# Patient Record
Sex: FEMALE | Race: WHITE
[De-identification: names, ages, dates, MRNs, and addresses within clinical notes are randomized per-mention and may not be internally consistent; named-entity substitution may affect disease eponyms.]

---

## 2017-04-19 NOTE — PDOC
History of Present Illness





- General


History Source: Patient


Exam Limitations: No Limitations





- History of Present Illness


Initial Comments: 





04/19/17 15:00


The patient is a 27 year old female, with no significant past medical history 

who presents to the emergency department with left arm pain. The patient 

reports doing a handstand just prior to arrival when she fell and landed on her 

left arm, hearing a crack. She notes having numbness and tingling in her UEs. 

She notes having decreased ROM and strength in her arm secondary to pain. She 

denies recent fevers, chills, headache or dizziness. She denies recent nausea, 

vomit, diarrhea or constipation. 





Allergies: NKA 


Past surgical history: None reported.


Social history: Nonsmoker. Denies EtOH use and recreational drug use. 


Primary Care Physician: N/A











<Kilo Zapien - Last Filed: 04/19/17 15:00>





- History of Present Illness


Initial Comments: 





04/19/17 17:18


Physical exam: Well-developed well-nourished complaining of pain in the elbow 

cooperative


Afebrile, vital signs normal


The patient refuses to move the left elbow due to pain. However, there appears 

to be no appreciable swelling or deformity. There is point tenderness over the 

radial head. Pulses are full. No distal sensory or motor deficits.





X-ray: Negative





Impression: No significant fracture. There may be an occult fracture of the 

radial head. The patient was informed of this and orthopedic follow-up was 

recommended





Plan: Rest sling and early mobilization. Orthopedic follow-up as directed. 

Patient fully ambulatory, pain adequately controlled, neurological exam intact 

and no distal numbness pain or tingling upon discharge with friend to follow-up 

as directed.





<Jay Nguyễn - Last Filed: 04/19/17 17:20>





- General


Chief Complaint: Pain


Stated Complaint: lt arm pain


Time Seen by Provider: 04/19/17 14:54





Past History





<Kilo Zapien - Last Filed: 04/19/17 15:00>





<Jay Nguyễn - Last Filed: 04/19/17 17:20>





- Past Medical History


Allergies/Adverse Reactions: 


 Allergies











Allergy/AdvReac Type Severity Reaction Status Date / Time


 


No Known Allergies Allergy   Verified 04/19/17 15:04











Home Medications: 


Ambulatory Orders





Norgestimate-Ethinyl Estradiol [Sprintec 28 Day Tablet] 1 each PO DAILY 04/19/ 17 











**Review of Systems





- Review of Systems


Able to Perform ROS?: Yes


Comments:: 





04/19/17 15:00


CONSTITUTIONAL:


Absent: fever, no chills, no fatigue


EYES:


Absent: visual changes


ENT:


Absent: ear pain, no sore throat


CARDIOVASCULAR:


Absent: chest pain, no palpitations


RESPIRATORY:


Absent: cough, no SOB


GI:


Absent: abdominal pain, no nausea, no vomiting, no constipation, no diarrhea


GENITOURINARY:


Absent: dysuria, no frequency, no hematuria


MUSKULOSKELETAL:


+Left arm pain. Absent: back pain, no arthralgia, no myalgia


SKIN:


Absent: rash


NEURO:


Absent: headache








<Kilo Zapien - Last Filed: 04/19/17 15:00>





Medical Decision Making





- Medical Decision Making





04/19/17 15:34


Refuses to supply urine specimen for pregnancy test. On birth control pills, no 

missed menses, aware that x-rays could be harmful to potential pregnancy, but 

willing to sign a waiver to refuse prior testing.





<Jay Nguyễn - Last Filed: 04/19/17 17:20>





*DC/Admit/Observation/Transfer





- Attestations


Scribe Attestion: 





04/19/17 15:01





Documentation prepared by Kilo Zapien, acting as medical scribe for Jay Alvarado MD.








<Kilo Zapien - Last Filed: 04/19/17 15:00>





- Discharge Dispostion


Admit: No





<Jay Nguyễn - Last Filed: 04/19/17 17:20>


Diagnosis at time of Disposition: 


Elbow injury


Qualifiers:


 Encounter type: initial encounter Laterality: left Qualified Code(s): S59.902A 

- Unspecified injury of left elbow, initial encounter





- Discharge Dispostion


Disposition: HOME


Condition at time of disposition: Improved





- Referrals


Referrals: 


Reese Dixon MD [Staff Physician] - 3 days





- Patient Instructions


Printed Discharge Instructions:  How to Use a Sling


Additional Instructions: 


Rest, ice, Aleve 2 pills every 12 hours as needed for pain





Use the sling for rest and elevation while active





Although your x-ray did not show a fracture, occasionally a fracture of the 

radial head will not be visualized on initial x-ray. If this is the case, and 

it is a minor fracture of the radial head, it will usually heal on its own 

without a problem. Your pain should improve within the next few days and you 

should begin gentle stretching and range of motion exercises once the pain 

improves.





We always recommend a follow-up examination, with further evaluation and 

treatment by an orthopedic specialist for an injury of this type. Please try to 

see an orthopedist for follow up as recommended.





- Post Discharge Activity


Work/School Note:  Back to School

## 2018-05-03 ENCOUNTER — HOSPITAL ENCOUNTER (EMERGENCY)
Dept: HOSPITAL 74 - FER | Age: 28
Discharge: HOME | End: 2018-05-03
Payer: SELF-PAY

## 2018-05-03 VITALS — SYSTOLIC BLOOD PRESSURE: 113 MMHG | TEMPERATURE: 97.9 F | DIASTOLIC BLOOD PRESSURE: 69 MMHG | HEART RATE: 64 BPM

## 2018-05-03 VITALS — BODY MASS INDEX: 22 KG/M2
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
## 2018-05-03 DIAGNOSIS — S61.254A: Primary | ICD-10-CM
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
## 2018-05-03 NOTE — PDOC
History of Present Illness





- General


History Source: Patient, Old Records


Exam Limitations: No Limitations





- History of Present Illness


Initial Comments: 


05/03/18 17:05


The patient is a 28 year old female with no significant past medical history 

who presents to the emergency department with a right ring finger pain for 1 

day. 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 

cat yesterday at 7 pm and the pain has persisted since. She reports that the 

cat has no known vaccine history. She did not attempt to treat her symptoms at 

home. 





<Toro Mckeon - Last Filed: 05/03/18 17:05>





<Jay Nguyễn - Last Filed: 05/03/18 18:06>





- General


Chief Complaint: Bite


Stated Complaint: CAT BITE


Time Seen by Provider: 05/03/18 16:11





Past History





<Toro Mckeon - Last Filed: 05/03/18 17:05>





- Immunization History


Immunization Up to Date: Yes





- Suicide/Smoking/Psychosocial Hx


Smoking History: Never smoked


Hx Alcohol Use: Yes (OCCASIONAL)


Drug/Substance Use Hx: No


Substance Use Type: None





<Jay Nguyễn - Last Filed: 05/03/18 18:06>





- Past Medical History


Allergies/Adverse Reactions: 


 Allergies











Allergy/AdvReac Type Severity Reaction Status Date / Time


 


No Known Allergies Allergy   Verified 05/03/18 16:22











Home Medications: 


Ambulatory Orders





Norgestimate-Ethinyl Estradiol [Sprintec 28 Day Tablet] 1 each PO DAILY 04/19/ 17 











**Review of Systems





- Review of Systems


Able to Perform ROS?: Yes


Comments:: 





05/03/18 17:06


CONSTITUTIONAL:


Absent: fever, no chills, no fatigue


EYES:


Absent: visual changes


ENT:


Absent: ear pain, no sore throat


CARDIOVASCULAR:


Absent: chest pain, no palpitations


RESPIRATORY:


Absent: cough, no SOB


GI:


Absent: abdominal pain, no nausea, no vomiting, no constipation, no diarrhea


GENITOURINARY:


Absent: dysuria, no frequency, no hematuria


MUSCULOSKELETAL:


(+) Right ring finger pain 


Absent: back pain


SKIN:


Absent: rash





<Toro Mckeon - Last Filed: 05/03/18 17:05>





*Physical Exam





- Vital Signs


 Last Vital Signs











Temp Pulse Resp BP Pulse Ox


 


 97.9 F   64   18   113/69   100 


 


 05/03/18 16:08  05/03/18 16:08  05/03/18 16:08  05/03/18 16:08  05/03/18 16:08














- Physical Exam


Comments: 


05/03/18 17:06


GENERAL: 


Well-appearing, well-nourished. No apparent distress.


HEENT: 


Normocephalic, atraumatic. PERRL, EOM intact.


CARDIOVASCULAR: 


Normal S1, S2. Regular rate and rhythm.


PULMONARY: 


Clear to auscultation bilaterally.


ABDOMEN: Soft, non-distended, non-tender. 


EXTREMITIES: 


(+) 1 mm superficial puncture to 4th digit on right hand. Normal ROM in all 

four extremities. No gross deformities.


SKIN: 


Warm, dry.  No rash


NEUROLOGICAL: 


No focal neurological deficits.








<Toro Mckeon - Last Filed: 05/03/18 17:05>





Medical Decision Making





- Medical Decision Making





05/03/18 18:03


Physical exam: Alert and oriented well-developed well-nourished no acute 

distress cheerful and cooperative


Afebrile vital signs normal


Examination of the right fourth finger reveals approximately 1 mm superficial 

puncture of the distal pulp. Not involving the joint. No surrounding erythema, 

swelling, tenderness, or drainage. Full range of motion of the joint without 

pain. No palpable skin irregularity or palpable foreign body





Impression: Bitten by impounded cat at her work site. Thoroughly scrubbed 15 

minutes at the work site. The cat has been confined for 4 days. It was a stray. 

It does not appear to be ill. The cat is still in custody and available for 

observation.





Plan: An x-ray for foreign body and antibiotics are recommended. The frequency 

of infection from cat bites as well as the significant disability that can 

occur from a hand infection were discussed with the patient at length, but she 

could not be convinced to consent to an x-ray or the administration of 

antibiotics. The patient refuses both. She does not fit the criteria for 

immediate post exposure rabies prophylaxis. Form was faxed to the health 

department and she was instructed to contact them tomorrow morning for further 

recommendation. If there is any problem with this contact, she is instructed to 

call the emergency room or return for follow-up.





<Jay Nguyễn - Last Filed: 05/03/18 18:06>





*DC/Admit/Observation/Transfer





- Attestations


Scribe Attestion: 


05/03/18 17:06


Documentation prepared by Toro Mckeon, acting as medical scribe for Jay Nguyễn DO.





<Toro Mckeon - Last Filed: 05/03/18 17:05>





- Discharge Dispostion


Admit: No





<Jay Nguyễn - Last Filed: 05/03/18 18:06>


Diagnosis at time of Disposition: 


Cat bite


Qualifiers:


 Encounter type: initial encounter Qualified Code(s): W55.01XA - Bitten by cat, 

initial encounter








- Discharge Dispostion


Disposition: HOME


Condition at time of disposition: Stable





- Referrals


Referrals: 


Goldberg,Neal D, MD [Staff Physician] - 2 Days





- Patient Instructions


Printed Discharge Instructions:  How to Care for a Domestic Animal Bite, How to 

Care for a Wild Animal Bite


Additional Instructions: 


Rest and elevate the hand and arm for 48 hours to minimize the risk of infection





See primary physician immediately if sign of infection develops, including 

increased pain, redness, swelling, or drainage. Cat bites are notorious for 

becoming infected, especially on the hand. Hand infections can lead to 

significant serious future disability if not treated





An x-ray of the finger as well as preventative antibiotics have been 

recommended today.





Contact health department tomorrow regarding further analysis of the animal and 

possible treatment. If you cannot contact the health department, return to the 

ER or phone and we will attempt to interview or clarify the situation in an 

expedient manner.